# Patient Record
(demographics unavailable — no encounter records)

---

## 2025-03-07 NOTE — DISCUSSION/SUMMARY
[FreeTextEntry1] : A - WWV     menstrual migraines  P- f/u 1 year    will refer to Devyn Carlin M.D. for eval of menstrual migraines      pap next due in 2027     pt is exercising     nutritional counseling provided,     referral for mammo/ breast sono given

## 2025-03-07 NOTE — HISTORY OF PRESENT ILLNESS
[FreeTextEntry1] : 43 y.o. P 3023  LNMP, 25, presents for annual exam,. pt feels well,  PMH-  hx of migraines ( menstrual),  hx of weakly reactive Hep C, followed by SERGE Ortiz,   hx of GERD,  and Sinusitis,  PSHX- balloon procedure for blockage of nasal passage,   related to chronic sinusitis. ,  FH- HTN - father, ( ) ,  CAD- father, s/p open heart surgery  CVA- father,   SH- negative,  GYN hx- resection of uterine septum  by MINE Benitez M.D. spont ab  x 2,   OB hx- C/S x 1,  x 2.   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 Q after sinus surgery, pt continues to have migraines, which are closely related to menses,

## 2025-03-07 NOTE — PHYSICAL EXAM
[Chaperone Present] : A chaperone was present in the examining room during all aspects of the physical examination [Appropriately responsive] : appropriately responsive [FreeTextEntry2] : Saba Moreno [Alert] : alert [No Acute Distress] : no acute distress [No Lymphadenopathy] : no lymphadenopathy [Regular Rate Rhythm] : regular rate rhythm [No Murmurs] : no murmurs [Clear to Auscultation B/L] : clear to auscultation bilaterally [Soft] : soft [Non-tender] : non-tender [Non-distended] : non-distended [No HSM] : No HSM [No Lesions] : no lesions [No Mass] : no mass [Oriented x3] : oriented x3 [Examination Of The Breasts] : a normal appearance [No Masses] : no breast masses were palpable [Labia Majora] : normal [Labia Minora] : normal [Normal] : normal [Anteversion] : anteverted [Uterine Adnexae] : normal